# Patient Record
Sex: FEMALE | Race: BLACK OR AFRICAN AMERICAN | NOT HISPANIC OR LATINO | URBAN - METROPOLITAN AREA
[De-identification: names, ages, dates, MRNs, and addresses within clinical notes are randomized per-mention and may not be internally consistent; named-entity substitution may affect disease eponyms.]

---

## 2022-08-27 ENCOUNTER — INPATIENT (INPATIENT)
Facility: HOSPITAL | Age: 87
LOS: 6 days | Discharge: HOME HEALTH | End: 2022-09-03
Attending: INTERNAL MEDICINE | Admitting: INTERNAL MEDICINE

## 2022-08-27 VITALS
TEMPERATURE: 99 F | HEIGHT: 65 IN | WEIGHT: 139.99 LBS | SYSTOLIC BLOOD PRESSURE: 144 MMHG | HEART RATE: 108 BPM | RESPIRATION RATE: 17 BRPM | OXYGEN SATURATION: 100 % | DIASTOLIC BLOOD PRESSURE: 82 MMHG

## 2022-08-27 LAB
ALBUMIN SERPL ELPH-MCNC: 4 G/DL — SIGNIFICANT CHANGE UP (ref 3.3–5)
ALP SERPL-CCNC: 39 U/L — LOW (ref 40–120)
ALT FLD-CCNC: 17 U/L — SIGNIFICANT CHANGE UP (ref 12–78)
ANION GAP SERPL CALC-SCNC: 5 MMOL/L — SIGNIFICANT CHANGE UP (ref 5–17)
APTT BLD: 30.3 SEC — SIGNIFICANT CHANGE UP (ref 27.5–35.5)
AST SERPL-CCNC: 22 U/L — SIGNIFICANT CHANGE UP (ref 15–37)
BASOPHILS # BLD AUTO: 0.02 K/UL — SIGNIFICANT CHANGE UP (ref 0–0.2)
BASOPHILS NFR BLD AUTO: 0.2 % — SIGNIFICANT CHANGE UP (ref 0–2)
BILIRUB SERPL-MCNC: 0.5 MG/DL — SIGNIFICANT CHANGE UP (ref 0.2–1.2)
BUN SERPL-MCNC: 42 MG/DL — HIGH (ref 7–23)
CALCIUM SERPL-MCNC: 9.4 MG/DL — SIGNIFICANT CHANGE UP (ref 8.5–10.1)
CHLORIDE SERPL-SCNC: 106 MMOL/L — SIGNIFICANT CHANGE UP (ref 96–108)
CK SERPL-CCNC: 104 U/L — SIGNIFICANT CHANGE UP (ref 26–192)
CO2 SERPL-SCNC: 32 MMOL/L — HIGH (ref 22–31)
CREAT SERPL-MCNC: 1.22 MG/DL — SIGNIFICANT CHANGE UP (ref 0.5–1.3)
D DIMER BLD IA.RAPID-MCNC: 1543 NG/ML DDU — HIGH
EGFR: 42 ML/MIN/1.73M2 — LOW
EOSINOPHIL # BLD AUTO: 0.11 K/UL — SIGNIFICANT CHANGE UP (ref 0–0.5)
EOSINOPHIL NFR BLD AUTO: 1.3 % — SIGNIFICANT CHANGE UP (ref 0–6)
FIBRINOGEN PPP-MCNC: 422 MG/DL — SIGNIFICANT CHANGE UP (ref 340–550)
GLUCOSE SERPL-MCNC: 101 MG/DL — HIGH (ref 70–99)
HCT VFR BLD CALC: 45.9 % — HIGH (ref 34.5–45)
HGB BLD-MCNC: 13.7 G/DL — SIGNIFICANT CHANGE UP (ref 11.5–15.5)
IMM GRANULOCYTES NFR BLD AUTO: 0.5 % — SIGNIFICANT CHANGE UP (ref 0–1.5)
INR BLD: 1 RATIO — SIGNIFICANT CHANGE UP (ref 0.88–1.16)
LACTATE SERPL-SCNC: 1.1 MMOL/L — SIGNIFICANT CHANGE UP (ref 0.7–2)
LYMPHOCYTES # BLD AUTO: 0.46 K/UL — LOW (ref 1–3.3)
LYMPHOCYTES # BLD AUTO: 5.2 % — LOW (ref 13–44)
MCHC RBC-ENTMCNC: 26.8 PG — LOW (ref 27–34)
MCHC RBC-ENTMCNC: 29.8 G/DL — LOW (ref 32–36)
MCV RBC AUTO: 89.8 FL — SIGNIFICANT CHANGE UP (ref 80–100)
MONOCYTES # BLD AUTO: 1.13 K/UL — HIGH (ref 0–0.9)
MONOCYTES NFR BLD AUTO: 12.8 % — SIGNIFICANT CHANGE UP (ref 2–14)
NEUTROPHILS # BLD AUTO: 7.04 K/UL — SIGNIFICANT CHANGE UP (ref 1.8–7.4)
NEUTROPHILS NFR BLD AUTO: 80 % — HIGH (ref 43–77)
NRBC # BLD: 0 /100 WBCS — SIGNIFICANT CHANGE UP (ref 0–0)
NT-PROBNP SERPL-SCNC: 580 PG/ML — HIGH (ref 0–450)
PLATELET # BLD AUTO: 318 K/UL — SIGNIFICANT CHANGE UP (ref 150–400)
POTASSIUM SERPL-MCNC: 4.1 MMOL/L — SIGNIFICANT CHANGE UP (ref 3.5–5.3)
POTASSIUM SERPL-SCNC: 4.1 MMOL/L — SIGNIFICANT CHANGE UP (ref 3.5–5.3)
PROT SERPL-MCNC: 8.1 GM/DL — SIGNIFICANT CHANGE UP (ref 6–8.3)
PROTHROM AB SERPL-ACNC: 12 SEC — SIGNIFICANT CHANGE UP (ref 10.5–13.4)
RBC # BLD: 5.11 M/UL — SIGNIFICANT CHANGE UP (ref 3.8–5.2)
RBC # FLD: 15.8 % — HIGH (ref 10.3–14.5)
SODIUM SERPL-SCNC: 143 MMOL/L — SIGNIFICANT CHANGE UP (ref 135–145)
TROPONIN I, HIGH SENSITIVITY RESULT: 16.8 NG/L — SIGNIFICANT CHANGE UP
WBC # BLD: 8.8 K/UL — SIGNIFICANT CHANGE UP (ref 3.8–10.5)
WBC # FLD AUTO: 8.8 K/UL — SIGNIFICANT CHANGE UP (ref 3.8–10.5)

## 2022-08-27 PROCEDURE — 99285 EMERGENCY DEPT VISIT HI MDM: CPT

## 2022-08-27 PROCEDURE — 93010 ELECTROCARDIOGRAM REPORT: CPT

## 2022-08-27 PROCEDURE — 71045 X-RAY EXAM CHEST 1 VIEW: CPT | Mod: 26

## 2022-08-27 RX ORDER — SODIUM CHLORIDE 9 MG/ML
1000 INJECTION INTRAMUSCULAR; INTRAVENOUS; SUBCUTANEOUS ONCE
Refills: 0 | Status: COMPLETED | OUTPATIENT
Start: 2022-08-27 | End: 2022-08-27

## 2022-08-27 RX ORDER — ACETAMINOPHEN 500 MG
650 TABLET ORAL ONCE
Refills: 0 | Status: COMPLETED | OUTPATIENT
Start: 2022-08-27 | End: 2022-08-27

## 2022-08-27 RX ORDER — ALBUTEROL 90 UG/1
2 AEROSOL, METERED ORAL ONCE
Refills: 0 | Status: COMPLETED | OUTPATIENT
Start: 2022-08-27 | End: 2022-08-27

## 2022-08-27 NOTE — ED PROVIDER NOTE - OBJECTIVE STATEMENT
89 yo F with COVID, sent in for "abnormal EKG.  Pt. cannot provide history due to confusion.  Notably hypoxic to 90% on RA.  No other complaints.   ROS: negative for fever, cough, headache, chest pain, shortness of breath, abd pain, nausea, vomiting, diarrhea, rash, paresthesia, and weakness--all other systems reviewed are negative.   PMH: HTN, arthritis; Meds: See EMR for list; SH: Denies smoking/drinking/drug use

## 2022-08-27 NOTE — ED PROVIDER NOTE - CLINICAL SUMMARY MEDICAL DECISION MAKING FREE TEXT BOX
89 yo F with + covid, confusion, hypoxia, doubt bacterial sepsis, ACS  -bsic labs, coags, crp, cpk, blood cx, ua, cx, dimer, ferritin, fibrinogen, ldh, lactate, procal, rvp, bnp, trop, CT brain, ekg, iv, tylenol, albuterol prn, monitor, iso, oxygen  -f/u results, reeval

## 2022-08-27 NOTE — ED ADULT NURSE NOTE - OBJECTIVE STATEMENT
assisting primary RN Gretel. pt received to bed iso 20 with niece who is her caregiver. as per pt's niece pt is at her baseline mental status now but had a cough and appeared lethargic today. pt had a positive covid home test and was brought to urgent care where she swabbed positive again and was sent to ED for low SpO2. pt's SpO2 84% on room air. pt placed on 4L nasal cannula and SpO2 is 100%. no skin breakdown per niece. as per pt's niece pt c/o intermittent pain on bottom of feet. pt ambulates with assistance at baseline per dom. on cardiac monitor at bedside

## 2022-08-27 NOTE — ED PROVIDER NOTE - PHYSICAL EXAMINATION
Vitals: HTN at 144/82, tachy at 108, febrile, hypoxic to 90% on RA  Gen: AAOx1, confused, NAD, sitting comfortably in stretcher, calm, non-toxic, responsive to voice   Head: ncat, perrla, eomi b/l  Neck: supple, no lymphadenopathy, no midline deviation  Heart: rrr, no m/r/g  Lungs: CTA b/l, no rales/ronchi/wheezes  Abd: soft, nontender, non-distended, no rebound or guarding  Ext: no clubbing/cyanosis/edema  Neuro: sensation and muscle strength intact b/l, no focal weakness or sensory loss

## 2022-08-28 DIAGNOSIS — U07.1 COVID-19: ICD-10-CM

## 2022-08-28 DIAGNOSIS — I10 ESSENTIAL (PRIMARY) HYPERTENSION: ICD-10-CM

## 2022-08-28 DIAGNOSIS — G30.9 ALZHEIMER'S DISEASE, UNSPECIFIED: ICD-10-CM

## 2022-08-28 LAB
ALBUMIN SERPL ELPH-MCNC: 3.4 G/DL — SIGNIFICANT CHANGE UP (ref 3.3–5)
ALP SERPL-CCNC: 39 U/L — LOW (ref 40–120)
ALT FLD-CCNC: 16 U/L — SIGNIFICANT CHANGE UP (ref 12–78)
APPEARANCE UR: CLEAR — SIGNIFICANT CHANGE UP
AST SERPL-CCNC: 21 U/L — SIGNIFICANT CHANGE UP (ref 15–37)
BACTERIA # UR AUTO: ABNORMAL
BILIRUB DIRECT SERPL-MCNC: 0.2 MG/DL — SIGNIFICANT CHANGE UP (ref 0–0.3)
BILIRUB INDIRECT FLD-MCNC: 0.3 MG/DL — SIGNIFICANT CHANGE UP (ref 0.2–1)
BILIRUB SERPL-MCNC: 0.5 MG/DL — SIGNIFICANT CHANGE UP (ref 0.2–1.2)
BILIRUB UR-MCNC: NEGATIVE — SIGNIFICANT CHANGE UP
COLOR SPEC: YELLOW — SIGNIFICANT CHANGE UP
CREAT SERPL-MCNC: 0.96 MG/DL — SIGNIFICANT CHANGE UP (ref 0.5–1.3)
CRP SERPL-MCNC: <3 MG/L — SIGNIFICANT CHANGE UP
DIFF PNL FLD: ABNORMAL
EGFR: 56 ML/MIN/1.73M2 — LOW
EPI CELLS # UR: SIGNIFICANT CHANGE UP
FERRITIN SERPL-MCNC: 56 NG/ML — SIGNIFICANT CHANGE UP (ref 15–150)
GLUCOSE UR QL: NEGATIVE MG/DL — SIGNIFICANT CHANGE UP
INR BLD: 1.02 RATIO — SIGNIFICANT CHANGE UP (ref 0.88–1.16)
KETONES UR-MCNC: NEGATIVE — SIGNIFICANT CHANGE UP
LDH SERPL L TO P-CCNC: 345 U/L — HIGH (ref 50–242)
LDH SERPL L TO P-CCNC: 595 U/L — HIGH (ref 50–242)
LEUKOCYTE ESTERASE UR-ACNC: ABNORMAL
NITRITE UR-MCNC: NEGATIVE — SIGNIFICANT CHANGE UP
PH UR: 6.5 — SIGNIFICANT CHANGE UP (ref 5–8)
PROT SERPL-MCNC: 7.8 GM/DL — SIGNIFICANT CHANGE UP (ref 6–8.3)
PROT UR-MCNC: 30 MG/DL
PROTHROM AB SERPL-ACNC: 12.2 SEC — SIGNIFICANT CHANGE UP (ref 10.5–13.4)
RAPID RVP RESULT: DETECTED
RBC CASTS # UR COMP ASSIST: SIGNIFICANT CHANGE UP /HPF (ref 0–4)
SARS-COV-2 RNA SPEC QL NAA+PROBE: DETECTED
SP GR SPEC: 1.01 — SIGNIFICANT CHANGE UP (ref 1.01–1.02)
UROBILINOGEN FLD QL: NEGATIVE MG/DL — SIGNIFICANT CHANGE UP
WBC UR QL: SIGNIFICANT CHANGE UP

## 2022-08-28 PROCEDURE — 71275 CT ANGIOGRAPHY CHEST: CPT | Mod: 26,MA

## 2022-08-28 PROCEDURE — 70450 CT HEAD/BRAIN W/O DYE: CPT | Mod: 26,MA

## 2022-08-28 RX ORDER — ACETAMINOPHEN 500 MG
650 TABLET ORAL EVERY 4 HOURS
Refills: 0 | Status: DISCONTINUED | OUTPATIENT
Start: 2022-08-28 | End: 2022-09-03

## 2022-08-28 RX ORDER — ENOXAPARIN SODIUM 100 MG/ML
60 INJECTION SUBCUTANEOUS EVERY 24 HOURS
Refills: 0 | Status: DISCONTINUED | OUTPATIENT
Start: 2022-08-28 | End: 2022-09-03

## 2022-08-28 RX ORDER — REMDESIVIR 5 MG/ML
100 INJECTION INTRAVENOUS EVERY 24 HOURS
Refills: 0 | Status: COMPLETED | OUTPATIENT
Start: 2022-08-29 | End: 2022-09-01

## 2022-08-28 RX ORDER — FUROSEMIDE 40 MG
40 TABLET ORAL EVERY 12 HOURS
Refills: 0 | Status: COMPLETED | OUTPATIENT
Start: 2022-08-28 | End: 2022-08-28

## 2022-08-28 RX ORDER — ENOXAPARIN SODIUM 100 MG/ML
64 INJECTION SUBCUTANEOUS EVERY 12 HOURS
Refills: 0 | Status: DISCONTINUED | OUTPATIENT
Start: 2022-08-28 | End: 2022-08-28

## 2022-08-28 RX ORDER — DEXAMETHASONE 0.5 MG/5ML
6 ELIXIR ORAL DAILY
Refills: 0 | Status: DISCONTINUED | OUTPATIENT
Start: 2022-08-28 | End: 2022-09-03

## 2022-08-28 RX ORDER — REMDESIVIR 5 MG/ML
200 INJECTION INTRAVENOUS EVERY 24 HOURS
Refills: 0 | Status: COMPLETED | OUTPATIENT
Start: 2022-08-28 | End: 2022-08-28

## 2022-08-28 RX ORDER — REMDESIVIR 5 MG/ML
INJECTION INTRAVENOUS
Refills: 0 | Status: COMPLETED | OUTPATIENT
Start: 2022-08-28 | End: 2022-09-01

## 2022-08-28 RX ORDER — LOSARTAN POTASSIUM 100 MG/1
100 TABLET, FILM COATED ORAL DAILY
Refills: 0 | Status: DISCONTINUED | OUTPATIENT
Start: 2022-08-28 | End: 2022-09-03

## 2022-08-28 RX ORDER — AMLODIPINE BESYLATE 2.5 MG/1
10 TABLET ORAL DAILY
Refills: 0 | Status: DISCONTINUED | OUTPATIENT
Start: 2022-08-28 | End: 2022-09-03

## 2022-08-28 RX ORDER — ONDANSETRON 8 MG/1
4 TABLET, FILM COATED ORAL EVERY 6 HOURS
Refills: 0 | Status: DISCONTINUED | OUTPATIENT
Start: 2022-08-28 | End: 2022-09-03

## 2022-08-28 RX ADMIN — ALBUTEROL 2 PUFF(S): 90 AEROSOL, METERED ORAL at 00:31

## 2022-08-28 RX ADMIN — LOSARTAN POTASSIUM 100 MILLIGRAM(S): 100 TABLET, FILM COATED ORAL at 10:06

## 2022-08-28 RX ADMIN — SODIUM CHLORIDE 1000 MILLILITER(S): 9 INJECTION INTRAMUSCULAR; INTRAVENOUS; SUBCUTANEOUS at 00:31

## 2022-08-28 RX ADMIN — Medication 650 MILLIGRAM(S): at 04:18

## 2022-08-28 RX ADMIN — Medication 40 MILLIGRAM(S): at 10:06

## 2022-08-28 RX ADMIN — Medication 40 MILLIGRAM(S): at 18:15

## 2022-08-28 RX ADMIN — REMDESIVIR 500 MILLIGRAM(S): 5 INJECTION INTRAVENOUS at 10:46

## 2022-08-28 RX ADMIN — AMLODIPINE BESYLATE 10 MILLIGRAM(S): 2.5 TABLET ORAL at 18:15

## 2022-08-28 RX ADMIN — Medication 650 MILLIGRAM(S): at 00:32

## 2022-08-28 RX ADMIN — ENOXAPARIN SODIUM 60 MILLIGRAM(S): 100 INJECTION SUBCUTANEOUS at 10:47

## 2022-08-28 RX ADMIN — Medication 6 MILLIGRAM(S): at 10:06

## 2022-08-28 NOTE — H&P ADULT - NSHPADDITIONALINFOADULT_GEN_ALL_CORE
CT head findings - Enlarged sella possibly due to underlying sellar mass. If clinically warranted, a nonemergent contrast-enhanced sellar MRI could be performed for further evaluation.  FU with family if appropriate.

## 2022-08-28 NOTE — H&P ADULT - NSHPLABSRESULTS_GEN_ALL_CORE
=======================================================  Labs:                        13.7   8.80  )-----------( 318      ( 27 Aug 2022 22:00 )             45.9     08-27    143  |  106  |  42<H>  ----------------------------<  101<H>  4.1   |  32<H>  |  1.22    Ca    9.4      27 Aug 2022 22:00    TPro  8.1  /  Alb  4.0  /  TBili  0.5  /  DBili  x   /  AST  22  /  ALT  17  /  AlkPhos  39<L>  08-27      Creatinine, Serum: 1.22 mg/dL (08-27-22 @ 22:00)      D-Dimer Assay, Quantitative: 1543 ng/mL DDU (08-27-22 @ 22:00)        WBC Count: 8.80 K/uL (08-27-22 @ 22:00)    SARS-CoV-2: Detected (08-27-22 @ 22:00)      Alkaline Phosphatase, Serum: 39 U/L (08-27-22 @ 22:00)  Alanine Aminotransferase (ALT/SGPT): 17 U/L (08-27-22 @ 22:00)  Aspartate Aminotransferase (AST/SGOT): 22 U/L (08-27-22 @ 22:00)  Bilirubin Total, Serum: 0.5 mg/dL (08-27-22 @ 22:00)

## 2022-08-28 NOTE — H&P ADULT - NSHPPHYSICALEXAM_GEN_ALL_CORE
VITALS:   T(C): 36.9 (08-28-22 @ 03:48), Max: 37.4 (08-27-22 @ 21:13)  HR: 60 (08-28-22 @ 03:48) (60 - 108)  BP: 142/62 (08-28-22 @ 03:48) (142/62 - 185/81)  RR: 17 (08-28-22 @ 03:48) (16 - 20)  SpO2: 98% (08-28-22 @ 03:48) (84% - 100%)    GENERAL: NAD, lying in bed comfortably, Complaining of cold  HEAD:  Atraumatic, Normocephalic  EYES: conjunctiva and sclera clear  ENT: Moist mucous membranes  NECK: Supple, No JVD  CHEST/LUNG: Clear to auscultation bilaterally; No rales, rhonchi, wheezing, or rubs. Unlabored respirations  HEART: Regular rate and rhythm;  ABDOMEN: BSx4; Soft, nontender, nondistended  EXTREMITIES:  . No clubbing, cyanosis, or edema  NERVOUS SYSTEM:  no focal deficits moving all extremities. Responds to name.   SKIN: No rashes or lesions

## 2022-08-28 NOTE — H&P ADULT - HISTORY OF PRESENT ILLNESS
89 yo F with pmh HTN and dx of covid in urgent care today sent in for "abnormal EKG.  Pt. cannot provide history due to confusion.  Notably hypoxic to 90% on RA.  No other complaints.   	 +for fever, +cough, - headache, chest pain, shortness of breath, abd pain, nausea, vomiting, diarrhea, rash, paresthesia, and weakness. Per niece pt is AAOx0-1 at baseline. on Friday noticed cough, then Saturday felt warm Taken to urgent care covid + with low oxygen. brought in to ED.     In ED vitals significnat for 02 90% on RA. . Labs with DDImer 1543, BUN 42/1.22 unsure baseline , proBNP 580. Covid +. CT head negative. ordered CTA for r/o PE still pending.

## 2022-08-28 NOTE — H&P ADULT - PROBLEM SELECTOR PLAN 1
- Decadrom  - Remdesivir  - supplemental o2  - CTA r/o PE in setting of ddimer >1500  - FU inflammatory markers.

## 2022-08-28 NOTE — H&P ADULT - ASSESSMENT
89 yo F with pmh HTN and dx of covid in urgent care today sent in for "abnormal EKG.  Pt. cannot provide history due to confusion.  Notably hypoxic to 90% on RA.

## 2022-08-28 NOTE — H&P ADULT - PROBLEM SELECTOR PLAN 3
- Pt home medication noted with metoprolol but HR in ED high 50s-60  - Will hold off metoprolol   - Amlodpine  - Losartan

## 2022-08-28 NOTE — PATIENT PROFILE ADULT - FALL HARM RISK - HARM RISK INTERVENTIONS

## 2022-08-29 LAB
A1C WITH ESTIMATED AVERAGE GLUCOSE RESULT: 5.9 % — HIGH (ref 4–5.6)
ALBUMIN SERPL ELPH-MCNC: 3.3 G/DL — SIGNIFICANT CHANGE UP (ref 3.3–5)
ALP SERPL-CCNC: 42 U/L — SIGNIFICANT CHANGE UP (ref 40–120)
ALT FLD-CCNC: 21 U/L — SIGNIFICANT CHANGE UP (ref 12–78)
ANION GAP SERPL CALC-SCNC: 5 MMOL/L — SIGNIFICANT CHANGE UP (ref 5–17)
AST SERPL-CCNC: 25 U/L — SIGNIFICANT CHANGE UP (ref 15–37)
BILIRUB DIRECT SERPL-MCNC: 0.1 MG/DL — SIGNIFICANT CHANGE UP (ref 0–0.3)
BILIRUB INDIRECT FLD-MCNC: 0.3 MG/DL — SIGNIFICANT CHANGE UP (ref 0.2–1)
BILIRUB SERPL-MCNC: 0.4 MG/DL — SIGNIFICANT CHANGE UP (ref 0.2–1.2)
BUN SERPL-MCNC: 45 MG/DL — HIGH (ref 7–23)
CALCIUM SERPL-MCNC: 8.9 MG/DL — SIGNIFICANT CHANGE UP (ref 8.5–10.1)
CHLORIDE SERPL-SCNC: 104 MMOL/L — SIGNIFICANT CHANGE UP (ref 96–108)
CO2 SERPL-SCNC: 32 MMOL/L — HIGH (ref 22–31)
CREAT SERPL-MCNC: 1.12 MG/DL — SIGNIFICANT CHANGE UP (ref 0.5–1.3)
CULTURE RESULTS: SIGNIFICANT CHANGE UP
EGFR: 46 ML/MIN/1.73M2 — LOW
ESTIMATED AVERAGE GLUCOSE: 123 MG/DL — HIGH (ref 68–114)
GLUCOSE SERPL-MCNC: 107 MG/DL — HIGH (ref 70–99)
INR BLD: 1.06 RATIO — SIGNIFICANT CHANGE UP (ref 0.88–1.16)
POTASSIUM SERPL-MCNC: 3.6 MMOL/L — SIGNIFICANT CHANGE UP (ref 3.5–5.3)
POTASSIUM SERPL-SCNC: 3.6 MMOL/L — SIGNIFICANT CHANGE UP (ref 3.5–5.3)
PROT SERPL-MCNC: 8 GM/DL — SIGNIFICANT CHANGE UP (ref 6–8.3)
PROTHROM AB SERPL-ACNC: 12.7 SEC — SIGNIFICANT CHANGE UP (ref 10.5–13.4)
SODIUM SERPL-SCNC: 141 MMOL/L — SIGNIFICANT CHANGE UP (ref 135–145)
SPECIMEN SOURCE: SIGNIFICANT CHANGE UP

## 2022-08-29 PROCEDURE — 99233 SBSQ HOSP IP/OBS HIGH 50: CPT

## 2022-08-29 RX ORDER — AMLODIPINE BESYLATE 2.5 MG/1
1 TABLET ORAL
Qty: 0 | Refills: 0 | DISCHARGE

## 2022-08-29 RX ADMIN — ENOXAPARIN SODIUM 60 MILLIGRAM(S): 100 INJECTION SUBCUTANEOUS at 10:18

## 2022-08-29 RX ADMIN — Medication 6 MILLIGRAM(S): at 05:36

## 2022-08-29 RX ADMIN — AMLODIPINE BESYLATE 10 MILLIGRAM(S): 2.5 TABLET ORAL at 10:19

## 2022-08-29 RX ADMIN — LOSARTAN POTASSIUM 100 MILLIGRAM(S): 100 TABLET, FILM COATED ORAL at 05:35

## 2022-08-29 RX ADMIN — REMDESIVIR 500 MILLIGRAM(S): 5 INJECTION INTRAVENOUS at 10:19

## 2022-08-30 LAB
ALBUMIN SERPL ELPH-MCNC: 3.1 G/DL — LOW (ref 3.3–5)
ALP SERPL-CCNC: 54 U/L — SIGNIFICANT CHANGE UP (ref 40–120)
ALT FLD-CCNC: 23 U/L — SIGNIFICANT CHANGE UP (ref 12–78)
AST SERPL-CCNC: 33 U/L — SIGNIFICANT CHANGE UP (ref 15–37)
BILIRUB DIRECT SERPL-MCNC: 0.1 MG/DL — SIGNIFICANT CHANGE UP (ref 0–0.3)
BILIRUB INDIRECT FLD-MCNC: 0.2 MG/DL — SIGNIFICANT CHANGE UP (ref 0.2–1)
BILIRUB SERPL-MCNC: 0.3 MG/DL — SIGNIFICANT CHANGE UP (ref 0.2–1.2)
CREAT SERPL-MCNC: 1.18 MG/DL — SIGNIFICANT CHANGE UP (ref 0.5–1.3)
CRP SERPL-MCNC: 7 MG/L — HIGH
EGFR: 43 ML/MIN/1.73M2 — LOW
INR BLD: 1.07 RATIO — SIGNIFICANT CHANGE UP (ref 0.88–1.16)
PROCALCITONIN SERPL-MCNC: 0.04 NG/ML — SIGNIFICANT CHANGE UP (ref 0.02–0.1)
PROT SERPL-MCNC: 7.5 GM/DL — SIGNIFICANT CHANGE UP (ref 6–8.3)
PROTHROM AB SERPL-ACNC: 12.8 SEC — SIGNIFICANT CHANGE UP (ref 10.5–13.4)

## 2022-08-30 PROCEDURE — 99233 SBSQ HOSP IP/OBS HIGH 50: CPT

## 2022-08-30 RX ORDER — FUROSEMIDE 40 MG
40 TABLET ORAL EVERY 8 HOURS
Refills: 0 | Status: COMPLETED | OUTPATIENT
Start: 2022-08-30 | End: 2022-08-30

## 2022-08-30 RX ADMIN — Medication 40 MILLIGRAM(S): at 10:25

## 2022-08-30 RX ADMIN — AMLODIPINE BESYLATE 10 MILLIGRAM(S): 2.5 TABLET ORAL at 10:26

## 2022-08-30 RX ADMIN — REMDESIVIR 500 MILLIGRAM(S): 5 INJECTION INTRAVENOUS at 10:25

## 2022-08-30 RX ADMIN — LOSARTAN POTASSIUM 100 MILLIGRAM(S): 100 TABLET, FILM COATED ORAL at 06:13

## 2022-08-30 RX ADMIN — ENOXAPARIN SODIUM 60 MILLIGRAM(S): 100 INJECTION SUBCUTANEOUS at 10:26

## 2022-08-30 RX ADMIN — Medication 40 MILLIGRAM(S): at 17:37

## 2022-08-30 RX ADMIN — Medication 6 MILLIGRAM(S): at 06:13

## 2022-08-31 LAB
ALBUMIN SERPL ELPH-MCNC: 3.4 G/DL — SIGNIFICANT CHANGE UP (ref 3.3–5)
ALP SERPL-CCNC: 42 U/L — SIGNIFICANT CHANGE UP (ref 40–120)
ALT FLD-CCNC: 31 U/L — SIGNIFICANT CHANGE UP (ref 12–78)
AST SERPL-CCNC: 45 U/L — HIGH (ref 15–37)
BILIRUB DIRECT SERPL-MCNC: 0.1 MG/DL — SIGNIFICANT CHANGE UP (ref 0–0.3)
BILIRUB INDIRECT FLD-MCNC: 0.3 MG/DL — SIGNIFICANT CHANGE UP (ref 0.2–1)
BILIRUB SERPL-MCNC: 0.4 MG/DL — SIGNIFICANT CHANGE UP (ref 0.2–1.2)
CREAT SERPL-MCNC: 1.2 MG/DL — SIGNIFICANT CHANGE UP (ref 0.5–1.3)
EGFR: 42 ML/MIN/1.73M2 — LOW
INR BLD: 1.02 RATIO — SIGNIFICANT CHANGE UP (ref 0.88–1.16)
PROT SERPL-MCNC: 8.4 GM/DL — HIGH (ref 6–8.3)
PROTHROM AB SERPL-ACNC: 12.3 SEC — SIGNIFICANT CHANGE UP (ref 10.5–13.4)

## 2022-08-31 PROCEDURE — 99233 SBSQ HOSP IP/OBS HIGH 50: CPT

## 2022-08-31 RX ORDER — FUROSEMIDE 40 MG
40 TABLET ORAL DAILY
Refills: 0 | Status: DISCONTINUED | OUTPATIENT
Start: 2022-08-31 | End: 2022-09-01

## 2022-08-31 RX ADMIN — AMLODIPINE BESYLATE 10 MILLIGRAM(S): 2.5 TABLET ORAL at 10:11

## 2022-08-31 RX ADMIN — REMDESIVIR 500 MILLIGRAM(S): 5 INJECTION INTRAVENOUS at 11:29

## 2022-08-31 RX ADMIN — Medication 6 MILLIGRAM(S): at 11:28

## 2022-08-31 RX ADMIN — ENOXAPARIN SODIUM 60 MILLIGRAM(S): 100 INJECTION SUBCUTANEOUS at 10:11

## 2022-08-31 RX ADMIN — LOSARTAN POTASSIUM 100 MILLIGRAM(S): 100 TABLET, FILM COATED ORAL at 10:11

## 2022-08-31 NOTE — DIETITIAN INITIAL EVALUATION ADULT - OTHER INFO
Unable to interview pt due to isolation precautions being maintained for COVID; also pt c dementia; non-verbal. Per medical record pt lives c niece who assists as necessary. No reports of any N/V/C/D or chew/swallowing difficulty; will change to Easy to Chew consistency for ease of eating. pt remains confused & restless

## 2022-08-31 NOTE — DIETITIAN INITIAL EVALUATION ADULT - PERTINENT LABORATORY DATA
08-31    x   |  x   |  x   ----------------------------<  x   x    |  x   |  1.20      TPro  8.4<H>  /  Alb  3.4  /  TBili  0.4  /  DBili  0.1  /  AST  45<H>  /  ALT  31  /  AlkPhos  42  08-31  A1C with Estimated Average Glucose Result: 5.9 %, 123 (08-29-22)

## 2022-09-01 ENCOUNTER — TRANSCRIPTION ENCOUNTER (OUTPATIENT)
Age: 87
End: 2022-09-01

## 2022-09-01 LAB
ALBUMIN SERPL ELPH-MCNC: 3.5 G/DL — SIGNIFICANT CHANGE UP (ref 3.3–5)
ALP SERPL-CCNC: 41 U/L — SIGNIFICANT CHANGE UP (ref 40–120)
ALT FLD-CCNC: 30 U/L — SIGNIFICANT CHANGE UP (ref 12–78)
ANION GAP SERPL CALC-SCNC: 3 MMOL/L — LOW (ref 5–17)
AST SERPL-CCNC: 42 U/L — HIGH (ref 15–37)
BILIRUB SERPL-MCNC: 0.4 MG/DL — SIGNIFICANT CHANGE UP (ref 0.2–1.2)
BUN SERPL-MCNC: 52 MG/DL — HIGH (ref 7–23)
CALCIUM SERPL-MCNC: 9.9 MG/DL — SIGNIFICANT CHANGE UP (ref 8.5–10.1)
CHLORIDE SERPL-SCNC: 111 MMOL/L — HIGH (ref 96–108)
CO2 SERPL-SCNC: 35 MMOL/L — HIGH (ref 22–31)
CREAT SERPL-MCNC: 1.05 MG/DL — SIGNIFICANT CHANGE UP (ref 0.5–1.3)
EGFR: 50 ML/MIN/1.73M2 — LOW
GLUCOSE SERPL-MCNC: 95 MG/DL — SIGNIFICANT CHANGE UP (ref 70–99)
HCT VFR BLD CALC: 54.3 % — HIGH (ref 34.5–45)
HGB BLD-MCNC: 16.5 G/DL — HIGH (ref 11.5–15.5)
INR BLD: 1 RATIO — SIGNIFICANT CHANGE UP (ref 0.88–1.16)
MCHC RBC-ENTMCNC: 26.6 PG — LOW (ref 27–34)
MCHC RBC-ENTMCNC: 30.4 G/DL — LOW (ref 32–36)
MCV RBC AUTO: 87.6 FL — SIGNIFICANT CHANGE UP (ref 80–100)
NRBC # BLD: 0 /100 WBCS — SIGNIFICANT CHANGE UP (ref 0–0)
PLATELET # BLD AUTO: 425 K/UL — HIGH (ref 150–400)
POTASSIUM SERPL-MCNC: 3.8 MMOL/L — SIGNIFICANT CHANGE UP (ref 3.5–5.3)
POTASSIUM SERPL-SCNC: 3.8 MMOL/L — SIGNIFICANT CHANGE UP (ref 3.5–5.3)
PROT SERPL-MCNC: 8.4 GM/DL — HIGH (ref 6–8.3)
PROTHROM AB SERPL-ACNC: 12 SEC — SIGNIFICANT CHANGE UP (ref 10.5–13.4)
RBC # BLD: 6.2 M/UL — HIGH (ref 3.8–5.2)
RBC # FLD: 16.8 % — HIGH (ref 10.3–14.5)
SODIUM SERPL-SCNC: 149 MMOL/L — HIGH (ref 135–145)
WBC # BLD: 9.31 K/UL — SIGNIFICANT CHANGE UP (ref 3.8–10.5)
WBC # FLD AUTO: 9.31 K/UL — SIGNIFICANT CHANGE UP (ref 3.8–10.5)

## 2022-09-01 PROCEDURE — 99232 SBSQ HOSP IP/OBS MODERATE 35: CPT

## 2022-09-01 RX ORDER — SODIUM CHLORIDE 9 MG/ML
1000 INJECTION, SOLUTION INTRAVENOUS
Refills: 0 | Status: DISCONTINUED | OUTPATIENT
Start: 2022-09-01 | End: 2022-09-02

## 2022-09-01 RX ORDER — LOSARTAN/HYDROCHLOROTHIAZIDE 100MG-25MG
1 TABLET ORAL
Qty: 0 | Refills: 0 | DISCHARGE

## 2022-09-01 RX ORDER — QUETIAPINE FUMARATE 200 MG/1
25 TABLET, FILM COATED ORAL ONCE
Refills: 0 | Status: COMPLETED | OUTPATIENT
Start: 2022-09-01 | End: 2022-09-01

## 2022-09-01 RX ORDER — LOSARTAN POTASSIUM 100 MG/1
1 TABLET, FILM COATED ORAL
Qty: 0 | Refills: 0 | DISCHARGE
Start: 2022-09-01

## 2022-09-01 RX ORDER — METOPROLOL TARTRATE 50 MG
1 TABLET ORAL
Qty: 0 | Refills: 0 | DISCHARGE

## 2022-09-01 RX ADMIN — QUETIAPINE FUMARATE 25 MILLIGRAM(S): 200 TABLET, FILM COATED ORAL at 23:10

## 2022-09-01 RX ADMIN — REMDESIVIR 500 MILLIGRAM(S): 5 INJECTION INTRAVENOUS at 11:34

## 2022-09-01 RX ADMIN — SODIUM CHLORIDE 75 MILLILITER(S): 9 INJECTION, SOLUTION INTRAVENOUS at 19:45

## 2022-09-01 RX ADMIN — Medication 40 MILLIGRAM(S): at 06:41

## 2022-09-01 RX ADMIN — AMLODIPINE BESYLATE 10 MILLIGRAM(S): 2.5 TABLET ORAL at 11:34

## 2022-09-01 RX ADMIN — LOSARTAN POTASSIUM 100 MILLIGRAM(S): 100 TABLET, FILM COATED ORAL at 06:40

## 2022-09-01 RX ADMIN — Medication 6 MILLIGRAM(S): at 06:40

## 2022-09-01 RX ADMIN — SODIUM CHLORIDE 75 MILLILITER(S): 9 INJECTION, SOLUTION INTRAVENOUS at 11:34

## 2022-09-01 RX ADMIN — SODIUM CHLORIDE 75 MILLILITER(S): 9 INJECTION, SOLUTION INTRAVENOUS at 23:10

## 2022-09-01 RX ADMIN — ENOXAPARIN SODIUM 60 MILLIGRAM(S): 100 INJECTION SUBCUTANEOUS at 11:34

## 2022-09-01 NOTE — DISCHARGE NOTE PROVIDER - NSDCMRMEDTOKEN_GEN_ALL_CORE_FT
amLODIPine 5 mg oral tablet: 1 tab(s) orally once a day  losartan-hydrochlorothiazide 100 mg-25 mg oral tablet: 1 tab(s) orally once a day  Metoprolol Succinate ER 50 mg oral tablet, extended release: 1 tab(s) orally once a day   amLODIPine 5 mg oral tablet: 1 tab(s) orally once a day  losartan 100 mg oral tablet: 1 tab(s) orally once a day

## 2022-09-01 NOTE — DISCHARGE NOTE PROVIDER - NSDCFUADDAPPT_GEN_ALL_CORE_FT
It is important to see your primary physician as well as the physicians noted below within the next week to perform a comprehensive medical review.  Call their offices for an appointment as soon as you leave the hospital.  You will also need to see them for renewal of your medications.  If you do not have a primary physician, contact the [facility] Physician Referral Service at [phone].  To obtain your results, you can access the [facility] Patient Portal at [email or web address].  Your medical issues appear to be stable at this time, but if your symptoms recur or worsen, contact your physicians and/or return to the hospital if necessary.  If you encounter any issues or questions with your medication, call your physicians before stopping the medication.

## 2022-09-01 NOTE — DISCHARGE NOTE PROVIDER - HOSPITAL COURSE
· Assessment    89 yo F with pmh HTN, alzheimer dementia, mostly non-verbal and dx of covid.  Admitted on 8/28 for acute hypoxemic respiratory failure due to COVID-19. Patient received decadron and Remdesivir. CTA performed to r/o PE - study negative for PE but fuond to have intersitial edema. Trial of lasix initiated and patient's o2 improved and was titrated off supplemental O2. Patient on metoprolol at home for HTN, pt bradycardic 50's to 60, amlodipine and losartan started and metoprolol held.            Continue Lasix, check echo  - FU inflammatory markers.       · Assessment    89 yo F with pmh HTN, alzheimer dementia, mostly non-verbal and dx of covid.  Admitted on 8/28 for acute hypoxemic respiratory failure due to COVID-19. Patient received decadron and Remdesivir. CTA performed to r/o PE - study negative for PE but fuond to have intersitial edema. Trial of lasix initiated and patient's o2 improved and was titrated off supplemental O2. Patient on metoprolol at home for HTN, pt bradycardic 50's to 60, amlodipine and losartan started and metoprolol held.      9/1  lasix d/c  Continue remd/ decadron  pending check echo  F/U inflammatory markers.       · Assessment    89 yo F with pmh HTN, alzheimer dementia, mostly non-verbal and dx of covid.  Admitted on 8/28 for acute hypoxemic respiratory failure due to COVID-19. Patient received decadron and Remdesivir. CTA performed to r/o PE - study negative for PE but fuond to have intersitial edema. Trial of lasix initiated and patient's o2 improved and was titrated off supplemental O2. Patient on metoprolol at home for HTN, pt bradycardic 50's to 60, amlodipine and losartan started and metoprolol held.      89 yo F with pmh HTN, alzheimer dementia, mostly non-verbal  and dx of covid.  Notably hypoxic to 90% on RA.     Problem/Plan - 1:  ·  Problem: Acute hypoxemic respiratory failure due to COVID-19.   ·  Plan: - S/P Decadron  - Remdesivir completed   - CTA r/o PE  but has intersitial edema.   - S/P Lasix, echo can be done outpt   - SO2 ok in RA     Problem/Plan - 2:  ·  Problem: Alzheimer disease.   ·  Plan: - Pt with baseline dementia AAOx0-1.     Problem/Plan - 3:  ·  Problem: HTN (hypertension).   ·  Plan: - Pt home medication noted with metoprolol but HR in ED high 50s-60  - Will hold off metoprolol , HCTZ  - Amlodipine  - Losartan.    Hypernatremia:  -Improved after   - Hold diuretic

## 2022-09-02 ENCOUNTER — TRANSCRIPTION ENCOUNTER (OUTPATIENT)
Age: 87
End: 2022-09-02

## 2022-09-02 LAB
ALBUMIN SERPL ELPH-MCNC: 3.3 G/DL — SIGNIFICANT CHANGE UP (ref 3.3–5)
ALP SERPL-CCNC: 38 U/L — LOW (ref 40–120)
ALT FLD-CCNC: 35 U/L — SIGNIFICANT CHANGE UP (ref 12–78)
ANION GAP SERPL CALC-SCNC: 9 MMOL/L — SIGNIFICANT CHANGE UP (ref 5–17)
AST SERPL-CCNC: 46 U/L — HIGH (ref 15–37)
BILIRUB DIRECT SERPL-MCNC: 0.2 MG/DL — SIGNIFICANT CHANGE UP (ref 0–0.3)
BILIRUB INDIRECT FLD-MCNC: 0.3 MG/DL — SIGNIFICANT CHANGE UP (ref 0.2–1)
BILIRUB SERPL-MCNC: 0.5 MG/DL — SIGNIFICANT CHANGE UP (ref 0.2–1.2)
BUN SERPL-MCNC: 48 MG/DL — HIGH (ref 7–23)
CALCIUM SERPL-MCNC: 9.5 MG/DL — SIGNIFICANT CHANGE UP (ref 8.5–10.1)
CHLORIDE SERPL-SCNC: 111 MMOL/L — HIGH (ref 96–108)
CO2 SERPL-SCNC: 30 MMOL/L — SIGNIFICANT CHANGE UP (ref 22–31)
CREAT SERPL-MCNC: 1.07 MG/DL — SIGNIFICANT CHANGE UP (ref 0.5–1.3)
CREAT SERPL-MCNC: 1.09 MG/DL — SIGNIFICANT CHANGE UP (ref 0.5–1.3)
CULTURE RESULTS: SIGNIFICANT CHANGE UP
CULTURE RESULTS: SIGNIFICANT CHANGE UP
D DIMER BLD IA.RAPID-MCNC: 182 NG/ML DDU — SIGNIFICANT CHANGE UP
EGFR: 48 ML/MIN/1.73M2 — LOW
EGFR: 49 ML/MIN/1.73M2 — LOW
GLUCOSE SERPL-MCNC: 101 MG/DL — HIGH (ref 70–99)
INR BLD: 1.06 RATIO — SIGNIFICANT CHANGE UP (ref 0.88–1.16)
POTASSIUM SERPL-MCNC: 3.1 MMOL/L — LOW (ref 3.5–5.3)
POTASSIUM SERPL-SCNC: 3.1 MMOL/L — LOW (ref 3.5–5.3)
PROT SERPL-MCNC: 7.9 GM/DL — SIGNIFICANT CHANGE UP (ref 6–8.3)
PROTHROM AB SERPL-ACNC: 12.6 SEC — SIGNIFICANT CHANGE UP (ref 10.5–13.4)
SODIUM SERPL-SCNC: 150 MMOL/L — HIGH (ref 135–145)
SPECIMEN SOURCE: SIGNIFICANT CHANGE UP
SPECIMEN SOURCE: SIGNIFICANT CHANGE UP

## 2022-09-02 PROCEDURE — 99233 SBSQ HOSP IP/OBS HIGH 50: CPT

## 2022-09-02 RX ORDER — SODIUM CHLORIDE 9 MG/ML
1000 INJECTION, SOLUTION INTRAVENOUS
Refills: 0 | Status: DISCONTINUED | OUTPATIENT
Start: 2022-09-02 | End: 2022-09-03

## 2022-09-02 RX ORDER — POTASSIUM CHLORIDE 20 MEQ
10 PACKET (EA) ORAL
Refills: 0 | Status: COMPLETED | OUTPATIENT
Start: 2022-09-02 | End: 2022-09-02

## 2022-09-02 RX ORDER — POTASSIUM CHLORIDE 20 MEQ
40 PACKET (EA) ORAL ONCE
Refills: 0 | Status: COMPLETED | OUTPATIENT
Start: 2022-09-02 | End: 2022-09-02

## 2022-09-02 RX ADMIN — Medication 100 MILLIEQUIVALENT(S): at 17:37

## 2022-09-02 RX ADMIN — AMLODIPINE BESYLATE 10 MILLIGRAM(S): 2.5 TABLET ORAL at 11:48

## 2022-09-02 RX ADMIN — LOSARTAN POTASSIUM 100 MILLIGRAM(S): 100 TABLET, FILM COATED ORAL at 05:38

## 2022-09-02 RX ADMIN — SODIUM CHLORIDE 100 MILLILITER(S): 9 INJECTION, SOLUTION INTRAVENOUS at 15:26

## 2022-09-02 RX ADMIN — ENOXAPARIN SODIUM 60 MILLIGRAM(S): 100 INJECTION SUBCUTANEOUS at 11:48

## 2022-09-02 RX ADMIN — Medication 100 MILLIEQUIVALENT(S): at 15:25

## 2022-09-02 RX ADMIN — Medication 100 MILLIEQUIVALENT(S): at 16:34

## 2022-09-02 RX ADMIN — Medication 6 MILLIGRAM(S): at 05:38

## 2022-09-02 NOTE — DISCHARGE NOTE NURSING/CASE MANAGEMENT/SOCIAL WORK - NSDCPEFALRISK_GEN_ALL_CORE
For information on Fall & Injury Prevention, visit: [email or web address] OR  [email or web address] OR  https://www.cdc.gov/steadi/patient.html

## 2022-09-02 NOTE — PROGRESS NOTE ADULT - REASON FOR ADMISSION
Cough , febrile and "very low oxygen" in urgent care.

## 2022-09-02 NOTE — DISCHARGE NOTE NURSING/CASE MANAGEMENT/SOCIAL WORK - PATIENT PORTAL LINK FT
You can access the FollowMyHealth Patient Portal offered by [facility] by registering at the following website: [email or web address]. By joining [employer]’s FollowMyHealth portal, you will also be able to view your health information using other applications (apps) compatible with our system.

## 2022-09-02 NOTE — PROGRESS NOTE ADULT - ASSESSMENT
89 yo F with pmh HTN, alzheimer dementia, mostly non-verbal  and dx of covid.  Notably hypoxic to 90% on RA.    Problem/Plan - 1:  ·  Problem: Acute hypoxemic respiratory failure due to COVID-19.   ·  Plan: - Decadron  - Remdesivir completed   - CTA r/o PE  but has intersitial edema.   - S/P Lasix, echo can be done outpt   - SO2 ok in RA    Problem/Plan - 2:  ·  Problem: Alzheimer disease.   ·  Plan: - Pt with baseline dementia AAOx0-1.    Problem/Plan - 3:  ·  Problem: HTN (hypertension).   ·  Plan: - Pt home medication noted with metoprolol but HR in ED high 50s-60  - Will hold off metoprolol   - Amlodipine  - Losartan.    Hypernatremia:  -D5W , encourage PO fluid    If remains stable can dc in am.
89 yo F with pmh HTN, alzheimer dementia, mostly non-verbal  and dx of covid.  Notably hypoxic to 90% on RA.    Problem/Plan - 1:  ·  Problem: Acute hypoxemic respiratory failure due to COVID-19.   ·  Plan: - Decadron  - Remdesivir completed   - CTA r/o PE  but has intersitial edema.   - S/P Lasix, echo can be done outpt   - SO2 ok in RA    Problem/Plan - 2:  ·  Problem: Alzheimer disease.   ·  Plan: - Pt with baseline dementia AAOx0-1.    Problem/Plan - 3:  ·  Problem: HTN (hypertension).   ·  Plan: - Pt home medication noted with metoprolol but HR in ED high 50s-60  - Will hold off metoprolol   - Amlodipine  - Losartan.    Hypernatremia:  -D5W , encourage PO fluid    Hypokalemia:  - K replaced     If remains stable can dc in am.
89 yo F with pmh HTN, alzheimer dementia, mostly non-verbal  and dx of covid.  Notably hypoxic to 90% on RA.    Problem/Plan - 1:  ·  Problem: Acute hypoxemic respiratory failure due to COVID-19.   ·  Plan: - Decadron  - Remdesivir    - CTA r/o PE  but has intersitial edema.   - trial of lasix has improved o2 and now on RA. will monitor for one more day and stop remd and dc home if care taker able-- she also has covid   - FU inflammatory markers.  - non-toxic     Problem/Plan - 2:  ·  Problem: Alzheimer disease.   ·  Plan: - Pt with baseline dementia AAOx0-1.    Problem/Plan - 3:  ·  Problem: HTN (hypertension).   ·  Plan: - Pt home medication noted with metoprolol but HR in ED high 50s-60  - Will hold off metoprolol   - Amlodpine  - Losartan.
89 yo F with pmh HTN, alzheimer dementia, mostly non-verbal  and dx of covid.  Notably hypoxic to 90% on RA.    Problem/Plan - 1:  ·  Problem: Acute hypoxemic respiratory failure due to COVID-19.   ·  Plan: - Decadron  - Remdesivir    - CTA r/o PE  but has intersitial edema.   - trial of lasix has improved o2 and was on RA. will restart lasox and check off o2 again. am cxr   - monitor for one more day and stop remd and dc home if care taker able-- she also has covid   - FU inflammatory markers.  - non-toxic     Problem/Plan - 2:  ·  Problem: Alzheimer disease.   ·  Plan: - Pt with baseline dementia AAOx0-1.    Problem/Plan - 3:  ·  Problem: HTN (hypertension).   ·  Plan: - Pt home medication noted with metoprolol but HR in ED high 50s-60  - Will hold off metoprolol   - Amlodpine  - Losartan.
89 yo F with pmh HTN, alzheimer dementia, mostly non-verbal  and dx of covid.  Notably hypoxic to 90% on RA.    Problem/Plan - 1:  ·  Problem: Acute hypoxemic respiratory failure due to COVID-19.   ·  Plan: - Decadron  - Remdesivir    - CTA r/o PE  but has intersitial edema.   - trial of lasix has improved o2 and was on RA. Continue Lasix, check echo  - FU inflammatory markers.    Problem/Plan - 2:  ·  Problem: Alzheimer disease.   ·  Plan: - Pt with baseline dementia AAOx0-1.    Problem/Plan - 3:  ·  Problem: HTN (hypertension).   ·  Plan: - Pt home medication noted with metoprolol but HR in ED high 50s-60  - Will hold off metoprolol   - Amlodpine  - Losartan.
89 yo F with pmh HTN, alzheimer dementia, mostly non-verbal  and dx of covid.  Notably hypoxic to 90% on RA.    Problem/Plan - 1:  ·  Problem: Acute hypoxemic respiratory failure due to COVID-19.   ·  Plan: - Decadron  - Remdesivir  - very stable on 2L   - CTA r/o PE  but has intersitial edema.   - trial of lasix   - FU inflammatory markers.  - non-toxic     Problem/Plan - 2:  ·  Problem: Alzheimer disease.   ·  Plan: - Pt with baseline dementia AAOx0-1.    Problem/Plan - 3:  ·  Problem: HTN (hypertension).   ·  Plan: - Pt home medication noted with metoprolol but HR in ED high 50s-60  - Will hold off metoprolol   - Amlodpine  - Losartan.

## 2022-09-02 NOTE — DISCHARGE NOTE NURSING/CASE MANAGEMENT/SOCIAL WORK - NSDCFUADDAPPT_GEN_ALL_CORE_FT
It is important to see your primary physician as well as the physicians noted below within the next week to perform a comprehensive medical review.  Call their offices for an appointment as soon as you leave the hospital.  You will also need to see them for renewal of your medications.  If you do not have a primary physician, contact the [facility] Physician Referral Service at [phone].  To obtain your results, you can access the [facility] Patient Portal at [email or web address].  Your medical issues appear to be stable at this time, but if your symptoms recur or worsen, contact your physicians and/or return to the hospital if necessary.  If you encounter any issues or questions with your medication, call your physicians before stopping the medication.

## 2022-09-02 NOTE — PROGRESS NOTE ADULT - SUBJECTIVE AND OBJECTIVE BOX
Patient is a 89y old  Female who presents with a chief complaint of Cough , febrile and "very low oxygen" in urgent care. (28 Aug 2022 05:06)      INTERVAL HPI/OVERNIGHT EVENTS: eating well and no resp distress     MEDICATIONS  (STANDING):  amLODIPine   Tablet 10 milliGRAM(s) Oral daily  dexAMETHasone  Injectable 6 milliGRAM(s) IV Push daily  enoxaparin Injectable 60 milliGRAM(s) SubCutaneous every 24 hours  furosemide   Injectable 40 milliGRAM(s) IV Push every 12 hours  losartan 100 milliGRAM(s) Oral daily  remdesivir  IVPB   IV Intermittent     MEDICATIONS  (PRN):  acetaminophen     Tablet .. 650 milliGRAM(s) Oral every 4 hours PRN Temp greater or equal to 38.5C (101.3F)  acetaminophen  Suppository .. 650 milliGRAM(s) Rectal every 4 hours PRN Temp greater or equal to 38.5C (101.3F)  ondansetron Injectable 4 milliGRAM(s) IV Push every 6 hours PRN Nausea and/or Vomiting      Allergies    No Known Allergies    Intolerances            Vital Signs Last 24 Hrs  T(C): 36.9 (28 Aug 2022 03:48), Max: 37.4 (27 Aug 2022 21:13)  T(F): 98.5 (28 Aug 2022 03:48), Max: 99.4 (27 Aug 2022 21:13)  HR: 71 (28 Aug 2022 07:59) (60 - 108)  BP: 167/84 (28 Aug 2022 07:59) (142/62 - 185/81)  BP(mean): --  RR: 14 (28 Aug 2022 07:59) (14 - 20)  SpO2: 97% (28 Aug 2022 07:59) (84% - 100%)    Parameters below as of 28 Aug 2022 07:59  Patient On (Oxygen Delivery Method): nasal cannula  O2 Flow (L/min): 2      PHYSICAL EXAM:  GENERAL: NAD  HEAD:  Atraumatic, Normocephalic  EYES: EOMI, PERRLA, conjunctiva and sclera clear  ENMT: No tonsillar erythema, exudates, or enlargement;   NECK: Supple, Normal thyroid  NERVOUS SYSTEM:  Alert & Oriented X0, ; Motor Strength 5/5 B/L upper and lower extremities; DTRs 2+ intact and symmetric  CHEST/LUNG: CTABL; No rales, rhonchi, wheezing, or rubs  HEART: Regular rate and rhythm; No murmurs, rubs, or gallops  ABDOMEN: Soft, Nontender, Nondistended; Bowel sounds present  EXTREMITIES:  2+ Peripheral Pulses, No clubbing, cyanosis, or edema  LYMPH: No lymphadenopathy noted  SKIN: No rashes or lesions    LABS:                                          13.7   8.80  )-----------( 318      ( 27 Aug 2022 22:00 )             45.9         x   |  x   |  x   ----------------------------<  x   x    |  x   |  0.96    Ca    9.4      27 Aug 2022 22:00    TPro  7.8  /  Alb  3.4  /  TBili  0.5  /  DBili  0.2  /  AST  21  /  ALT  16  /  AlkPhos  39<L>      PT/INR - ( 28 Aug 2022 10:00 )   PT: 12.2 sec;   INR: 1.02 ratio         PTT - ( 27 Aug 2022 22:00 )  PTT:30.3 sec  Urinalysis Basic - ( 28 Aug 2022 05:10 )    Color: Yellow / Appearance: Clear / S.010 / pH: x  Gluc: x / Ketone: Negative  / Bili: Negative / Urobili: Negative mg/dL   Blood: x / Protein: 30 mg/dL / Nitrite: Negative   Leuk Esterase: Small / RBC: 0-2 /HPF / WBC 0-2   Sq Epi: x / Non Sq Epi: Occasional / Bacteria: Occasional          CAPILLARY BLOOD GLUCOSE          RADIOLOGY & ADDITIONAL TESTS:    Imaging Personally Reviewed:  [ ] YES  [ ] NO    Consultant(s) Notes Reviewed:  [ ] YES  [ ] NO    Care Discussed with Consultants/Other Providers [ ] YES  [ ] NO
Patient is a 89y old  Female who presents with a chief complaint of COVID/ CONFUSION     (31 Aug 2022 14:57)      INTERVAL HPI/OVERNIGHT EVENTS:  Pt was seen and examined, no acute events.    MEDICATIONS  (STANDING):  amLODIPine   Tablet 10 milliGRAM(s) Oral daily  dexAMETHasone  Injectable 6 milliGRAM(s) IV Push daily  enoxaparin Injectable 60 milliGRAM(s) SubCutaneous every 24 hours  losartan 100 milliGRAM(s) Oral daily  remdesivir  IVPB   IV Intermittent   remdesivir  IVPB 100 milliGRAM(s) IV Intermittent every 24 hours    MEDICATIONS  (PRN):  acetaminophen     Tablet .. 650 milliGRAM(s) Oral every 4 hours PRN Temp greater or equal to 38.5C (101.3F)  acetaminophen  Suppository .. 650 milliGRAM(s) Rectal every 4 hours PRN Temp greater or equal to 38.5C (101.3F)  ondansetron Injectable 4 milliGRAM(s) IV Push every 6 hours PRN Nausea and/or Vomiting      Allergies  No Known Allergies        Vital Signs Last 24 Hrs  T(C): 36.4 (31 Aug 2022 17:06), Max: 36.8 (31 Aug 2022 11:20)  T(F): 97.6 (31 Aug 2022 17:06), Max: 98.2 (31 Aug 2022 11:20)  HR: 65 (31 Aug 2022 20:00) (57 - 77)  BP: 149/84 (31 Aug 2022 17:06) (108/65 - 149/84)  BP(mean): --  RR: 19 (31 Aug 2022 17:06) (18 - 20)  SpO2: 97% (31 Aug 2022 17:06) (91% - 99%)    Parameters below as of 31 Aug 2022 17:06  Patient On (Oxygen Delivery Method): nasal cannula  O2 Flow (L/min): 2      PHYSICAL EXAM:  GENERAL: NAD  HEAD:  Atraumatic, Normocephalic  EYES: EOMI, PERRLA, conjunctiva and sclera clear  ENMT: No tonsillar erythema, exudates, or enlargement;   NECK: Supple, Normal thyroid  NERVOUS SYSTEM:  Alert & Oriented X0, ; Motor Strength 5/5 B/L upper and lower extremities; DTRs 2+ intact and symmetric  CHEST/LUNG: CTABL; Diminished   HEART: Regular rate and rhythm; No murmurs, rubs, or gallops  ABDOMEN: Soft, Nontender, Nondistended; Bowel sounds present  EXTREMITIES:  2+ Peripheral Pulses, No clubbing, cyanosis, or edema  LYMPH: No lymphadenopathy noted  SKIN: No rashes or lesions    LABS:    08-31    x   |  x   |  x   ----------------------------<  x   x    |  x   |  1.20      TPro  8.4<H>  /  Alb  3.4  /  TBili  0.4  /  DBili  0.1  /  AST  45<H>  /  ALT  31  /  AlkPhos  42  08-31    PT/INR - ( 31 Aug 2022 07:30 )   PT: 12.3 sec;   INR: 1.02 ratio             CAPILLARY BLOOD GLUCOSE          Culture - Urine (collected 28 Aug 2022 05:10)  Source: Clean Catch Clean Catch (Midstream)  Final Report (29 Aug 2022 12:23):    >=3 organisms. Probable collection contamination.    Culture - Blood (collected 27 Aug 2022 22:10)  Source: .Blood Blood-Peripheral  Preliminary Report (29 Aug 2022 13:01):    No growth to date.    Culture - Blood (collected 27 Aug 2022 22:00)  Source: .Blood Blood-Peripheral  Preliminary Report (29 Aug 2022 13:01):    No growth to date.      RADIOLOGY & ADDITIONAL TESTS:    Imaging Personally Reviewed:  [ ] YES  [ ] NO    Consultant(s) Notes Reviewed:  [ ] YES  [ ] NO    Care Discussed with Consultants/Other Providers [ ] YES  [ ] NO
Patient is a 89y old  Female who presents with a chief complaint of Cough , febrile and "very low oxygen" in urgent care. (28 Aug 2022 05:06)      INTERVAL HPI/OVERNIGHT EVENTS: eating well and no resp distress     MEDICATIONS  (STANDING):  amLODIPine   Tablet 10 milliGRAM(s) Oral daily  dexAMETHasone  Injectable 6 milliGRAM(s) IV Push daily  enoxaparin Injectable 60 milliGRAM(s) SubCutaneous every 24 hours  losartan 100 milliGRAM(s) Oral daily  remdesivir  IVPB   IV Intermittent   remdesivir  IVPB 100 milliGRAM(s) IV Intermittent every 24 hours    MEDICATIONS  (PRN):  acetaminophen     Tablet .. 650 milliGRAM(s) Oral every 4 hours PRN Temp greater or equal to 38.5C (101.3F)  acetaminophen  Suppository .. 650 milliGRAM(s) Rectal every 4 hours PRN Temp greater or equal to 38.5C (101.3F)  ondansetron Injectable 4 milliGRAM(s) IV Push every 6 hours PRN Nausea and/or Vomiting  Vomiting      Allergies    No Known Allergies    Intolerances          Vital Signs Last 24 Hrs  T(C): 36.9 (30 Aug 2022 05:32), Max: 36.9 (30 Aug 2022 05:32)  T(F): 98.4 (30 Aug 2022 05:32), Max: 98.4 (30 Aug 2022 05:32)  HR: 64 (30 Aug 2022 05:32) (60 - 67)  BP: 141/67 (30 Aug 2022 05:32) (99/61 - 141/67)  BP(mean): --  RR: 18 (30 Aug 2022 05:32) (18 - 18)  SpO2: 98% (30 Aug 2022 05:32) (92% - 100%)    Parameters below as of 30 Aug 2022 05:32  Patient On (Oxygen Delivery Method): nasal cannula  O2 Flow (L/min): 3      PHYSICAL EXAM:  GENERAL: NAD  HEAD:  Atraumatic, Normocephalic  EYES: EOMI, PERRLA, conjunctiva and sclera clear  ENMT: No tonsillar erythema, exudates, or enlargement;   NECK: Supple, Normal thyroid  NERVOUS SYSTEM:  Alert & Oriented X0, ; Motor Strength 5/5 B/L upper and lower extremities; DTRs 2+ intact and symmetric  CHEST/LUNG: CTABL; No rales, rhonchi, wheezing, or rubs  HEART: Regular rate and rhythm; No murmurs, rubs, or gallops  ABDOMEN: Soft, Nontender, Nondistended; Bowel sounds present  EXTREMITIES:  2+ Peripheral Pulses, No clubbing, cyanosis, or edema  LYMPH: No lymphadenopathy noted  SKIN: No rashes or lesions    LABS:                       x   |  x   |  x   ----------------------------<  x   x    |  x   |  1.18    Ca    8.9      29 Aug 2022 08:00    TPro  7.5  /  Alb  3.1<L>  /  TBili  0.3  /  DBili  0.1  /  AST  33  /  ALT  23  /  AlkPhos  54  0830    PT/INR - ( 30 Aug 2022 06:12 )   PT: 12.8 sec;   INR: 1.07 ratio                PTT - ( 27 Aug 2022 22:00 )  PTT:30.3 sec  Urinalysis Basic - ( 28 Aug 2022 05:10 )    Color: Yellow / Appearance: Clear / S.010 / pH: x  Gluc: x / Ketone: Negative  / Bili: Negative / Urobili: Negative mg/dL   Blood: x / Protein: 30 mg/dL / Nitrite: Negative   Leuk Esterase: Small / RBC: 0-2 /HPF / WBC 0-2   Sq Epi: x / Non Sq Epi: Occasional / Bacteria: Occasional            CAPILLARY BLOOD GLUCOSE          RADIOLOGY & ADDITIONAL TESTS:    Imaging Personally Reviewed:  [ ] YES  [ ] NO    Consultant(s) Notes Reviewed:  [ ] YES  [ ] NO    Care Discussed with Consultants/Other Providers [ ] YES  [ ] NO
Patient is a 89y old  Female who presents with a chief complaint of Cough , febrile and "very low oxygen" in urgent care. (01 Sep 2022 17:48)    INTERVAL HPI/OVERNIGHT EVENTS:  Pt was seen and examined, no acute events.    MEDICATIONS  (STANDING):  amLODIPine   Tablet 10 milliGRAM(s) Oral daily  dexAMETHasone  Injectable 6 milliGRAM(s) IV Push daily  dextrose 5%. 1000 milliLiter(s) (75 mL/Hr) IV Continuous <Continuous>  enoxaparin Injectable 60 milliGRAM(s) SubCutaneous every 24 hours  losartan 100 milliGRAM(s) Oral daily    MEDICATIONS  (PRN):  acetaminophen     Tablet .. 650 milliGRAM(s) Oral every 4 hours PRN Temp greater or equal to 38.5C (101.3F)  acetaminophen  Suppository .. 650 milliGRAM(s) Rectal every 4 hours PRN Temp greater or equal to 38.5C (101.3F)  ondansetron Injectable 4 milliGRAM(s) IV Push every 6 hours PRN Nausea and/or Vomiting      Allergies  No Known Allergies      Vital Signs Last 24 Hrs  T(C): 37.3 (01 Sep 2022 16:45), Max: 37.3 (01 Sep 2022 10:40)  T(F): 99.2 (01 Sep 2022 16:45), Max: 99.2 (01 Sep 2022 10:40)  HR: 70 (01 Sep 2022 16:45) (64 - 84)  BP: 139/72 (01 Sep 2022 16:45) (132/78 - 166/76)  BP(mean): --  RR: 18 (01 Sep 2022 16:45) (17 - 19)  SpO2: 93% (01 Sep 2022 16:45) (93% - 97%)    Parameters below as of 01 Sep 2022 16:45  Patient On (Oxygen Delivery Method): room air      PHYSICAL EXAM:  GENERAL: NAD  HEAD:  Atraumatic  EYES: PERRLA  ENMT: Mouth moist  NECK: Supple   NERVOUS SYSTEM:  Awake, alert, confused  CHEST/LUNG: Clear  HEART: RRR  ABDOMEN: Soft, non tender  EXTREMITIES:  no edema   SKIN: no rash    LABS:                        16.5   9.31  )-----------( 425      ( 01 Sep 2022 06:50 )             54.3     09-01    149<H>  |  111<H>  |  52<H>  ----------------------------<  95  3.8   |  35<H>  |  1.05    Ca    9.9      01 Sep 2022 06:50    TPro  8.4<H>  /  Alb  3.5  /  TBili  0.4  /  DBili  x   /  AST  42<H>  /  ALT  30  /  AlkPhos  41  09-01    PT/INR - ( 01 Sep 2022 06:50 )   PT: 12.0 sec;   INR: 1.00 ratio             CAPILLARY BLOOD GLUCOSE          Culture - Urine (collected 28 Aug 2022 05:10)  Source: Clean Catch Clean Catch (Midstream)  Final Report (29 Aug 2022 12:23):    >=3 organisms. Probable collection contamination.    Culture - Blood (collected 27 Aug 2022 22:10)  Source: .Blood Blood-Peripheral  Preliminary Report (29 Aug 2022 13:01):    No growth to date.    Culture - Blood (collected 27 Aug 2022 22:00)  Source: .Blood Blood-Peripheral  Preliminary Report (29 Aug 2022 13:01):    No growth to date.      RADIOLOGY & ADDITIONAL TESTS:    Imaging Personally Reviewed:  [ ] YES  [ ] NO    Consultant(s) Notes Reviewed:  [ ] YES  [ ] NO    Care Discussed with Consultants/Other Providers [ ] YES  [ ] NO
Patient is a 89y old  Female who presents with a chief complaint of Cough , febrile and "very low oxygen" in urgent care. (01 Sep 2022 18:21)      INTERVAL HPI/OVERNIGHT EVENTS:  Pt was seen and examined, no acute events.      MEDICATIONS  (STANDING):  amLODIPine   Tablet 10 milliGRAM(s) Oral daily  dexAMETHasone  Injectable 6 milliGRAM(s) IV Push daily  dextrose 5%. 1000 milliLiter(s) (100 mL/Hr) IV Continuous <Continuous>  enoxaparin Injectable 60 milliGRAM(s) SubCutaneous every 24 hours  losartan 100 milliGRAM(s) Oral daily    MEDICATIONS  (PRN):  acetaminophen     Tablet .. 650 milliGRAM(s) Oral every 4 hours PRN Temp greater or equal to 38.5C (101.3F)  acetaminophen  Suppository .. 650 milliGRAM(s) Rectal every 4 hours PRN Temp greater or equal to 38.5C (101.3F)  ondansetron Injectable 4 milliGRAM(s) IV Push every 6 hours PRN Nausea and/or Vomiting      Allergies  No Known Allergies      Vital Signs Last 24 Hrs  T(C): 36.2 (02 Sep 2022 16:00), Max: 36.6 (01 Sep 2022 23:34)  T(F): 97.1 (02 Sep 2022 16:00), Max: 97.8 (01 Sep 2022 23:34)  HR: 90 (02 Sep 2022 16:00) (55 - 98)  BP: 181/98 (02 Sep 2022 16:00) (152/84 - 181/98)  BP(mean): --  RR: 19 (02 Sep 2022 16:00) (18 - 19)  SpO2: 93% (02 Sep 2022 16:00) (93% - 94%)    Parameters below as of 02 Sep 2022 16:00  Patient On (Oxygen Delivery Method): room air      PHYSICAL EXAM:  GENERAL: NAD  HEAD:  Atraumatic  EYES: PERRLA  ENMT: Mouth moist  NECK: Supple   NERVOUS SYSTEM:  Awake, alert, confused  CHEST/LUNG: Clear  HEART: RRR  ABDOMEN: Soft, non tender  EXTREMITIES:  no edema   SKIN: no rash      LABS:                        16.5   9.31  )-----------( 425      ( 01 Sep 2022 06:50 )             54.3     09-02    150<H>  |  111<H>  |  48<H>  ----------------------------<  101<H>  3.1<L>   |  30  |  1.09    Ca    9.5      02 Sep 2022 07:00    TPro  7.9  /  Alb  3.3  /  TBili  0.5  /  DBili  0.2  /  AST  46<H>  /  ALT  35  /  AlkPhos  38<L>  09-02    PT/INR - ( 02 Sep 2022 07:00 )   PT: 12.6 sec;   INR: 1.06 ratio             CAPILLARY BLOOD GLUCOSE          RADIOLOGY & ADDITIONAL TESTS:    Imaging Personally Reviewed:  [ ] YES  [ ] NO    Consultant(s) Notes Reviewed:  [ ] YES  [ ] NO    Care Discussed with Consultants/Other Providers [ ] YES  [ ] NO
Patient is a 89y old  Female who presents with a chief complaint of Cough , febrile and "very low oxygen" in urgent care. (28 Aug 2022 05:06)      INTERVAL HPI/OVERNIGHT EVENTS: eating well and no resp distress     MEDICATIONS  (STANDING):  amLODIPine   Tablet 10 milliGRAM(s) Oral daily  dexAMETHasone  Injectable 6 milliGRAM(s) IV Push daily  enoxaparin Injectable 60 milliGRAM(s) SubCutaneous every 24 hours  losartan 100 milliGRAM(s) Oral daily  remdesivir  IVPB   IV Intermittent   remdesivir  IVPB 100 milliGRAM(s) IV Intermittent every 24 hours    MEDICATIONS  (PRN):  acetaminophen     Tablet .. 650 milliGRAM(s) Oral every 4 hours PRN Temp greater or equal to 38.5C (101.3F)  acetaminophen  Suppository .. 650 milliGRAM(s) Rectal every 4 hours PRN Temp greater or equal to 38.5C (101.3F)  ondansetron Injectable 4 milliGRAM(s) IV Push every 6 hours PRN Nausea and/or Vomiting  Vomiting      Allergies    No Known Allergies    Intolerances          Vital Signs Last 24 Hrs  T(C): 36.3 (29 Aug 2022 11:47), Max: 37.5 (28 Aug 2022 18:23)  T(F): 97.3 (29 Aug 2022 11:47), Max: 99.5 (28 Aug 2022 18:23)  HR: 63 (29 Aug 2022 11:47) (55 - 75)  BP: 135/72 (29 Aug 2022 11:47) (135/72 - 178/87)  BP(mean): --  RR: 18 (29 Aug 2022 11:47) (18 - 23)  SpO2: 100% (29 Aug 2022 11:47) (95% - 100%)    Parameters below as of 29 Aug 2022 11:47  Patient On (Oxygen Delivery Method): nasal cannula  O2 Flow (L/min): 3      PHYSICAL EXAM:  GENERAL: NAD  HEAD:  Atraumatic, Normocephalic  EYES: EOMI, PERRLA, conjunctiva and sclera clear  ENMT: No tonsillar erythema, exudates, or enlargement;   NECK: Supple, Normal thyroid  NERVOUS SYSTEM:  Alert & Oriented X0, ; Motor Strength 5/5 B/L upper and lower extremities; DTRs 2+ intact and symmetric  CHEST/LUNG: CTABL; No rales, rhonchi, wheezing, or rubs  HEART: Regular rate and rhythm; No murmurs, rubs, or gallops  ABDOMEN: Soft, Nontender, Nondistended; Bowel sounds present  EXTREMITIES:  2+ Peripheral Pulses, No clubbing, cyanosis, or edema  LYMPH: No lymphadenopathy noted  SKIN: No rashes or lesions    LABS:                                   13.7   8.80  )-----------( 318      ( 27 Aug 2022 22:00 )             45.9     08-29    141  |  104  |  45<H>  ----------------------------<  107<H>  3.6   |  32<H>  |  1.12    Ca    8.9      29 Aug 2022 08:00    TPro  8.0  /  Alb  3.3  /  TBili  0.4  /  DBili  0.1  /  AST  25  /  ALT  21  /  AlkPhos  42  08-29    PT/INR - ( 29 Aug 2022 08:00 )   PT: 12.7 sec;   INR: 1.06 ratio         PTT - ( 27 Aug 2022 22:00 )  PTT:30.3 sec  Urinalysis Basic - ( 28 Aug 2022 05:10 )    Color: Yellow / Appearance: Clear / S.010 / pH: x  Gluc: x / Ketone: Negative  / Bili: Negative / Urobili: Negative mg/dL   Blood: x / Protein: 30 mg/dL / Nitrite: Negative   Leuk Esterase: Small / RBC: 0-2 /HPF / WBC 0-2   Sq Epi: x / Non Sq Epi: Occasional / Bacteria: Occasional            CAPILLARY BLOOD GLUCOSE          RADIOLOGY & ADDITIONAL TESTS:    Imaging Personally Reviewed:  [ ] YES  [ ] NO    Consultant(s) Notes Reviewed:  [ ] YES  [ ] NO    Care Discussed with Consultants/Other Providers [ ] YES  [ ] NO

## 2022-09-03 VITALS — HEART RATE: 77 BPM | RESPIRATION RATE: 19 BRPM | OXYGEN SATURATION: 95 % | TEMPERATURE: 98 F

## 2022-09-03 LAB
ALBUMIN SERPL ELPH-MCNC: 2.9 G/DL — LOW (ref 3.3–5)
ALP SERPL-CCNC: 35 U/L — LOW (ref 40–120)
ALT FLD-CCNC: 35 U/L — SIGNIFICANT CHANGE UP (ref 12–78)
ANION GAP SERPL CALC-SCNC: 4 MMOL/L — LOW (ref 5–17)
AST SERPL-CCNC: 47 U/L — HIGH (ref 15–37)
BILIRUB DIRECT SERPL-MCNC: 0.1 MG/DL — SIGNIFICANT CHANGE UP (ref 0–0.3)
BILIRUB INDIRECT FLD-MCNC: 0.6 MG/DL — SIGNIFICANT CHANGE UP (ref 0.2–1)
BILIRUB SERPL-MCNC: 0.7 MG/DL — SIGNIFICANT CHANGE UP (ref 0.2–1.2)
BUN SERPL-MCNC: 48 MG/DL — HIGH (ref 7–23)
CALCIUM SERPL-MCNC: 9 MG/DL — SIGNIFICANT CHANGE UP (ref 8.5–10.1)
CHLORIDE SERPL-SCNC: 106 MMOL/L — SIGNIFICANT CHANGE UP (ref 96–108)
CO2 SERPL-SCNC: 32 MMOL/L — HIGH (ref 22–31)
CREAT SERPL-MCNC: 0.92 MG/DL — SIGNIFICANT CHANGE UP (ref 0.5–1.3)
CREAT SERPL-MCNC: 0.92 MG/DL — SIGNIFICANT CHANGE UP (ref 0.5–1.3)
EGFR: 58 ML/MIN/1.73M2 — LOW
EGFR: 58 ML/MIN/1.73M2 — LOW
GLUCOSE SERPL-MCNC: 119 MG/DL — HIGH (ref 70–99)
INR BLD: 1.07 RATIO — SIGNIFICANT CHANGE UP (ref 0.88–1.16)
POTASSIUM SERPL-MCNC: 4.4 MMOL/L — SIGNIFICANT CHANGE UP (ref 3.5–5.3)
POTASSIUM SERPL-SCNC: 4.4 MMOL/L — SIGNIFICANT CHANGE UP (ref 3.5–5.3)
PROT SERPL-MCNC: 6.9 GM/DL — SIGNIFICANT CHANGE UP (ref 6–8.3)
PROTHROM AB SERPL-ACNC: 12.9 SEC — SIGNIFICANT CHANGE UP (ref 10.5–13.4)
SODIUM SERPL-SCNC: 142 MMOL/L — SIGNIFICANT CHANGE UP (ref 135–145)

## 2022-09-03 PROCEDURE — 99239 HOSP IP/OBS DSCHRG MGMT >30: CPT

## 2022-09-03 RX ADMIN — ENOXAPARIN SODIUM 60 MILLIGRAM(S): 100 INJECTION SUBCUTANEOUS at 11:16

## 2022-09-03 RX ADMIN — LOSARTAN POTASSIUM 100 MILLIGRAM(S): 100 TABLET, FILM COATED ORAL at 06:03

## 2022-09-03 RX ADMIN — SODIUM CHLORIDE 100 MILLILITER(S): 9 INJECTION, SOLUTION INTRAVENOUS at 06:01

## 2022-09-03 RX ADMIN — Medication 6 MILLIGRAM(S): at 06:00

## 2022-09-03 RX ADMIN — AMLODIPINE BESYLATE 10 MILLIGRAM(S): 2.5 TABLET ORAL at 11:16

## 2022-09-08 DIAGNOSIS — R60.9 EDEMA, UNSPECIFIED: ICD-10-CM

## 2022-09-08 DIAGNOSIS — G30.9 ALZHEIMER'S DISEASE, UNSPECIFIED: ICD-10-CM

## 2022-09-08 DIAGNOSIS — U07.1 COVID-19: ICD-10-CM

## 2022-09-08 DIAGNOSIS — M13.80 OTHER SPECIFIED ARTHRITIS, UNSPECIFIED SITE: ICD-10-CM

## 2022-09-08 DIAGNOSIS — E87.6 HYPOKALEMIA: ICD-10-CM

## 2022-09-08 DIAGNOSIS — I10 ESSENTIAL (PRIMARY) HYPERTENSION: ICD-10-CM

## 2022-09-08 DIAGNOSIS — E87.0 HYPEROSMOLALITY AND HYPERNATREMIA: ICD-10-CM

## 2022-09-08 DIAGNOSIS — F02.80 DEMENTIA IN OTHER DISEASES CLASSIFIED ELSEWHERE, UNSPECIFIED SEVERITY, WITHOUT BEHAVIORAL DISTURBANCE, PSYCHOTIC DISTURBANCE, MOOD DISTURBANCE, AND ANXIETY: ICD-10-CM

## 2022-09-08 DIAGNOSIS — J96.01 ACUTE RESPIRATORY FAILURE WITH HYPOXIA: ICD-10-CM

## 2022-09-08 DIAGNOSIS — R00.1 BRADYCARDIA, UNSPECIFIED: ICD-10-CM

## 2024-05-08 NOTE — DISCHARGE NOTE PROVIDER - NSDCCPCAREPLAN_GEN_ALL_CORE_FT
Hospital Medicine History & Physical      Date of Admission: 5/8/2024    Date of Service:  Pt seen/examined on 8 May 2024     [x]Admitted to Inpatient with expected LOS greater than two midnights due to medical therapy.  []Placed in Observation status.    Chief Admission Complaint:  SOB      Presenting Admission History:       67 y.o. female ex-smoker w/ hx presumed COPD who presented to [facility] with a several day hx of progressive SOB/WELLER w/ mild L sided CP, worse w/ cough and now severe, limiting her ADLs and much worse w/ any exertion.  Cough is non-productive but she does endorse feeling generally ill over the last several days, since Thurs/Friday 2/3 May actually.    The patient denies any fever/chills or other signs/sxs of systemic illness or identifiable aggravating/alleviating factors\"      Assessment/Plan:      Current Principal Problem:  COPD (chronic obstructive pulmonary disease) (HCC)    COPD - w/out chronic hypoxic respiratory failure on no baseline home O2.  Normally controlled on home medication regimen - continued. Here w/ acute exacerbation.  IV Steroids/HHN/ABX.     PNA - likely gram positive CAP w/ Strep Pneumonia.  Started on empiric Rocephin/Azithro in ED on 8 May.  Changed to DAILY dosing. CTPA w/out evidence of acute PE    Acute Respiratory Failure - w/ hypoxia, POArrival.  Presence of clinical respiratory distress w/ tachypnea/dyspnea/SOB and wheezing w/ use of accessory muscles to breath.  Supplemental O2 and wean as tolerated.     HyperLipidemia - normally controlled on home Statin. Continued.  F/U w/ PCP outpt for medication initiation/adjustment as needed.     GERD - w/out active signs/sxs of dysphagia/odynophagia. No evidence of active PUD or hx of GI bleed. Controlled on home PPI - continue.      Discussed management and the need for Hospitalization of the patient w/ the Emergency Department Provider: Peter Thrasher MD    CXR: I have reviewed the CXR with the following  PRINCIPAL DISCHARGE DIAGNOSIS  Diagnosis: COVID  Assessment and Plan of Treatment: · Assessment  89 yo F with pmh HTN, alzheimer dementia, mostly non-verbal and dx of covid.  Admitted on 8/28 for acute hypoxemic respiratory failure due to COVID-19. Patient received decadron and Remdesivir. CTA performed to r/o PE - study negative for PE but fuond to have intersitial edema. Trial of lasix initiated and patient's o2 improved and was titrated off supplemental O2. Patient on metoprolol at home for HTN, pt bradycardic 50's to 60, amlodipine and losartan started and metoprolol held.    89 yo F with pmh HTN, alzheimer dementia, mostly non-verbal  and dx of covid.  Notably hypoxic to 90% on RA.   Problem/Plan - 1:  ·  Problem: Acute hypoxemic respiratory failure due to COVID-19.   ·  Plan: - S/P Decadron  - Remdesivir completed   - CTA r/o PE  but has intersitial edema.   - S/P Lasix, echo can be done outpt   - SO2 ok in RA   Problem/Plan - 2:  ·  Problem: Alzheimer disease.   ·  Plan: - Pt with baseline dementia AAOx0-1.   Problem/Plan - 3:  ·  Problem: HTN (hypertension).   ·  Plan: - Pt home medication noted with metoprolol but HR in ED high 50s-60  - Will hold off metoprolol   - Amlodipine  - Losartan.  Hypernatremia:  -Improved after         SECONDARY DISCHARGE DIAGNOSES  Diagnosis: Confusion  Assessment and Plan of Treatment: